# Patient Record
Sex: MALE | Race: WHITE | NOT HISPANIC OR LATINO
[De-identification: names, ages, dates, MRNs, and addresses within clinical notes are randomized per-mention and may not be internally consistent; named-entity substitution may affect disease eponyms.]

---

## 2021-03-10 ENCOUNTER — APPOINTMENT (OUTPATIENT)
Dept: ELECTROPHYSIOLOGY | Facility: CLINIC | Age: 86
End: 2021-03-10
Payer: MEDICARE

## 2021-03-10 VITALS
WEIGHT: 208 LBS | HEART RATE: 70 BPM | BODY MASS INDEX: 31.52 KG/M2 | HEIGHT: 68 IN | SYSTOLIC BLOOD PRESSURE: 130 MMHG | DIASTOLIC BLOOD PRESSURE: 80 MMHG

## 2021-03-10 DIAGNOSIS — I42.9 CARDIOMYOPATHY, UNSPECIFIED: ICD-10-CM

## 2021-03-10 DIAGNOSIS — Z45.02 ENCOUNTER FOR ADJUSTMENT AND MANAGEMENT OF AUTOMATIC IMPLANTABLE CARDIAC DEFIBRILLATOR: ICD-10-CM

## 2021-03-10 PROCEDURE — 93000 ELECTROCARDIOGRAM COMPLETE: CPT | Mod: 59

## 2021-03-10 PROCEDURE — 99204 OFFICE O/P NEW MOD 45 MIN: CPT | Mod: 25

## 2021-03-10 PROCEDURE — 99072 ADDL SUPL MATRL&STAF TM PHE: CPT

## 2021-03-10 NOTE — REVIEW OF SYSTEMS
[Feeling Fatigued] : feeling fatigued [Dyspnea on exertion] : dyspnea during exertion [Lower Ext Edema] : lower extremity edema [Easy Bleeding] : a tendency for easy bleeding [Negative] : Integumentary [Fever] : no fever [Shortness Of Breath] : no shortness of breath [Chest Pain] : no chest pain [Palpitations] : no palpitations [Dizziness] : no dizziness [Convulsions] : no convulsions [Confusion] : no confusion was observed [Anxiety] : no anxiety [Easy Bruising] : no tendency for easy bruising

## 2021-03-10 NOTE — HISTORY OF PRESENT ILLNESS
[FreeTextEntry1] : 89 year old gentleman with history of HTN, HLD, CAD s/p MI and AI s/p CABG and AVR(t) in 2002, ischemic cardiomyopathy with severe LV dysfunction s/p ICD 2011, thoracic aortic aneurysm (conservatively managed), presenting for evaluation of AF, t/c left atrial appendage occlusion, and device management as his ICD is now approaching RRT.  \par \par He has a long history of ischemic cardiomyopathy, as well as IVCD (LB-like). He had been previously well compensated with minimal symptoms, and had seen Dr. Hunter in 2011 and initially CRT-D was recommended. He did undergo dual chamber ICD implant on 4/14/2011 (SJM device).  \par \par On device interrogation he initially had short runs of AT c/w atrial flutter, and since 11/2020 he has been in persistent atrial fibrillation.  \par \par Interrogation of his dual chamber SJM ICD today noted 4% AP and 44%  in DDD 60 mode. The ICD is on FDA advisory for premature battery depletion, and is now approaching RRT with 4.3 month remaining. He has been in persistent AF since 11/2020, and no ventricular arrhythmias have been noted.  The RA is SJM 2088TC, and RV is SJM 7121Q.\par \par He had previously been on Coumadin following his cardiac surgery, but did have bleeding complications including nose bleeds and hemothorax. He has not subsequently had bleeding. Recently he was started on eliquis by Dr. Bernal, which he is tolerating.  \par \par He recently has been feeling more fatigued, and is mildly active. He has had significant LE edema, but denies dyspnea.  \par \par Current meds include coreg 3.125 bid, enalapril 10, atorvastatin, vasotec, Lasix, and eliquis.  \par \par   \par \par

## 2021-03-10 NOTE — DISCUSSION/SUMMARY
[FreeTextEntry1] : 89 year old gentleman with history of HTN, HLD, CAD s/p MI and AI s/p CABG and AVR(t) in 2002, ischemic cardiomyopathy with severe LV dysfunction s/p ICD 2011, thoracic aortic aneurysm (conservatively managed), presenting for evaluation of AF, t/c left atrial appendage occlusion, and device management as his ICD is now approaching RRT. He does have an IVCD (QRS about 140 ms) and has had frequent ventricular pacing (>40% pacing burden recently)- given this in the setting of his cardiomyopathy and CHF, he does qualify for CRT therapy, and upgrade to CRT can be considered at the time of his ICD generator change. We did discuss the risks and benefits of this, vs ICD generator replacement alone, including potential symptomatic benefit as well as procedure-related risks including bleeding, infection, cardiac perforation and pulmonary injury. He would like to consider this further with input from Dr. Bernal. In addition, we did discuss the risks and benefits of ongoing ICD therapy as he is now in his 90s, but he expressed preference to continue ICD functionalities. \par \par In addition, he has newly persistent AF, and has a CHADSVASc score of 5. He has recently been started on anticoagulation with Eliquis. Given his remote history of bleeding, we did discuss the potential for left atrial appendage occlusion as an alternative to anticoagulation if he has significant recurrent bleeding concerns on anticoagulation. In the future, DCCV could also be considered, but would defer this until after upcoming device procedure.  \par \par -ICD gen change and possible CRT upgrade.  will plan in about 4 months when device is expected to reach BERNADETTE.\par \par -continue eliquis, but hold 24 hours prior to device procedure.  \par \par -EP follow-up after above

## 2021-03-10 NOTE — PHYSICAL EXAM
[General Appearance - Well Developed] : well developed [Well Groomed] : well groomed [General Appearance - Well Nourished] : well nourished [General Appearance - In No Acute Distress] : no acute distress [Normal Conjunctiva] : the conjunctiva exhibited no abnormalities [Normal Oral Mucosa] : normal oral mucosa [Normal Jugular Venous V Waves Present] : normal jugular venous V waves present [Heart Sounds] : normal S1 and S2 [Respiration, Rhythm And Depth] : normal respiratory rhythm and effort [Auscultation Breath Sounds / Voice Sounds] : lungs were clear to auscultation bilaterally [Abdomen Soft] : soft [Nail Clubbing] : no clubbing of the fingernails [Cyanosis, Localized] : no localized cyanosis [] : no rash [Oriented To Time, Place, And Person] : oriented to person, place, and time [No Anxiety] : not feeling anxious [FreeTextEntry1] : irregularly irregular. 2+ pitting edema

## 2021-03-10 NOTE — REASON FOR VISIT
[Consultation] : a consultation regarding [Atrial Fibrillation] : atrial fibrillation [Spouse] : spouse [FreeTextEntry1] : ref Dr Bernal

## 2021-06-22 ENCOUNTER — APPOINTMENT (OUTPATIENT)
Dept: DISASTER EMERGENCY | Facility: CLINIC | Age: 86
End: 2021-06-22

## 2021-06-25 ENCOUNTER — INPATIENT (INPATIENT)
Facility: HOSPITAL | Age: 86
LOS: 0 days | Discharge: HOME | DRG: 227 | End: 2021-06-26
Attending: STUDENT IN AN ORGANIZED HEALTH CARE EDUCATION/TRAINING PROGRAM | Admitting: STUDENT IN AN ORGANIZED HEALTH CARE EDUCATION/TRAINING PROGRAM
Payer: MEDICARE

## 2021-06-25 ENCOUNTER — TRANSCRIPTION ENCOUNTER (OUTPATIENT)
Age: 86
End: 2021-06-25

## 2021-06-25 VITALS
HEART RATE: 63 BPM | DIASTOLIC BLOOD PRESSURE: 83 MMHG | RESPIRATION RATE: 18 BRPM | TEMPERATURE: 98 F | OXYGEN SATURATION: 97 % | SYSTOLIC BLOOD PRESSURE: 118 MMHG

## 2021-06-25 DIAGNOSIS — I42.9 CARDIOMYOPATHY, UNSPECIFIED: ICD-10-CM

## 2021-06-25 DIAGNOSIS — I25.5 ISCHEMIC CARDIOMYOPATHY: ICD-10-CM

## 2021-06-25 LAB
ABO RH CONFIRMATION: SIGNIFICANT CHANGE UP
ANION GAP SERPL CALC-SCNC: 11 MMOL/L — SIGNIFICANT CHANGE UP (ref 5–17)
BLD GP AB SCN SERPL QL: SIGNIFICANT CHANGE UP
BUN SERPL-MCNC: 36.7 MG/DL — HIGH (ref 8–20)
CALCIUM SERPL-MCNC: 9.3 MG/DL — SIGNIFICANT CHANGE UP (ref 8.6–10.2)
CHLORIDE SERPL-SCNC: 102 MMOL/L — SIGNIFICANT CHANGE UP (ref 98–107)
CO2 SERPL-SCNC: 27 MMOL/L — SIGNIFICANT CHANGE UP (ref 22–29)
CREAT SERPL-MCNC: 1.43 MG/DL — HIGH (ref 0.5–1.3)
GLUCOSE SERPL-MCNC: 107 MG/DL — HIGH (ref 70–99)
HCT VFR BLD CALC: 38.5 % — LOW (ref 39–50)
HGB BLD-MCNC: 13 G/DL — SIGNIFICANT CHANGE UP (ref 13–17)
MAGNESIUM SERPL-MCNC: 2.1 MG/DL — SIGNIFICANT CHANGE UP (ref 1.6–2.6)
MCHC RBC-ENTMCNC: 33 PG — SIGNIFICANT CHANGE UP (ref 27–34)
MCHC RBC-ENTMCNC: 33.8 GM/DL — SIGNIFICANT CHANGE UP (ref 32–36)
MCV RBC AUTO: 97.7 FL — SIGNIFICANT CHANGE UP (ref 80–100)
PLATELET # BLD AUTO: 126 K/UL — LOW (ref 150–400)
POTASSIUM SERPL-MCNC: 4.4 MMOL/L — SIGNIFICANT CHANGE UP (ref 3.5–5.3)
POTASSIUM SERPL-SCNC: 4.4 MMOL/L — SIGNIFICANT CHANGE UP (ref 3.5–5.3)
RBC # BLD: 3.94 M/UL — LOW (ref 4.2–5.8)
RBC # FLD: 14.1 % — SIGNIFICANT CHANGE UP (ref 10.3–14.5)
SODIUM SERPL-SCNC: 140 MMOL/L — SIGNIFICANT CHANGE UP (ref 135–145)
WBC # BLD: 5.63 K/UL — SIGNIFICANT CHANGE UP (ref 3.8–10.5)
WBC # FLD AUTO: 5.63 K/UL — SIGNIFICANT CHANGE UP (ref 3.8–10.5)

## 2021-06-25 PROCEDURE — 33234 REMOVAL OF PACEMAKER SYSTEM: CPT

## 2021-06-25 PROCEDURE — 33225 L VENTRIC PACING LEAD ADD-ON: CPT

## 2021-06-25 PROCEDURE — 93010 ELECTROCARDIOGRAM REPORT: CPT

## 2021-06-25 RX ORDER — ATORVASTATIN CALCIUM 80 MG/1
1 TABLET, FILM COATED ORAL
Qty: 0 | Refills: 0 | DISCHARGE

## 2021-06-25 RX ORDER — CARVEDILOL PHOSPHATE 80 MG/1
1 CAPSULE, EXTENDED RELEASE ORAL
Qty: 0 | Refills: 0 | DISCHARGE

## 2021-06-25 RX ORDER — ASPIRIN/CALCIUM CARB/MAGNESIUM 324 MG
81 TABLET ORAL DAILY
Refills: 0 | Status: DISCONTINUED | OUTPATIENT
Start: 2021-06-25 | End: 2021-06-26

## 2021-06-25 RX ORDER — ASPIRIN/CALCIUM CARB/MAGNESIUM 324 MG
1 TABLET ORAL
Qty: 0 | Refills: 0 | DISCHARGE

## 2021-06-25 RX ORDER — CEFAZOLIN SODIUM 1 G
2000 VIAL (EA) INJECTION
Refills: 0 | Status: COMPLETED | OUTPATIENT
Start: 2021-06-25 | End: 2021-06-26

## 2021-06-25 RX ORDER — CARVEDILOL PHOSPHATE 80 MG/1
6.25 CAPSULE, EXTENDED RELEASE ORAL EVERY 12 HOURS
Refills: 0 | Status: DISCONTINUED | OUTPATIENT
Start: 2021-06-25 | End: 2021-06-26

## 2021-06-25 RX ORDER — ATORVASTATIN CALCIUM 80 MG/1
10 TABLET, FILM COATED ORAL AT BEDTIME
Refills: 0 | Status: DISCONTINUED | OUTPATIENT
Start: 2021-06-25 | End: 2021-06-26

## 2021-06-25 RX ORDER — FUROSEMIDE 40 MG
40 TABLET ORAL DAILY
Refills: 0 | Status: DISCONTINUED | OUTPATIENT
Start: 2021-06-25 | End: 2021-06-26

## 2021-06-25 RX ORDER — OXYCODONE AND ACETAMINOPHEN 5; 325 MG/1; MG/1
1 TABLET ORAL EVERY 4 HOURS
Refills: 0 | Status: DISCONTINUED | OUTPATIENT
Start: 2021-06-25 | End: 2021-06-26

## 2021-06-25 RX ORDER — ACETAMINOPHEN 500 MG
650 TABLET ORAL EVERY 6 HOURS
Refills: 0 | Status: DISCONTINUED | OUTPATIENT
Start: 2021-06-25 | End: 2021-06-26

## 2021-06-25 RX ORDER — FUROSEMIDE 40 MG
1 TABLET ORAL
Qty: 0 | Refills: 0 | DISCHARGE

## 2021-06-25 RX ADMIN — Medication 100 MILLIGRAM(S): at 21:59

## 2021-06-25 RX ADMIN — ATORVASTATIN CALCIUM 10 MILLIGRAM(S): 80 TABLET, FILM COATED ORAL at 21:59

## 2021-06-25 RX ADMIN — CARVEDILOL PHOSPHATE 6.25 MILLIGRAM(S): 80 CAPSULE, EXTENDED RELEASE ORAL at 17:48

## 2021-06-25 NOTE — DISCHARGE NOTE PROVIDER - CARE PROVIDER_API CALL
Lauri Coates)  Cardiology; Internal Medicine  [address], [address]  [place] [address]  Phone: [phone]  Fax: [phone]  Established Patient  Follow Up Time: 2 weeks    Franki Bernal)  Cardiovascular Disease; Internal Medicine  [facility], [address], [address]  [place] [address]  Phone: [phone]  Fax: [phone]  Established Patient  Follow Up Time: Routine

## 2021-06-25 NOTE — DISCHARGE NOTE PROVIDER - HOSPITAL COURSE
89 year old gentleman with history of HTN, HLD, CAD s/p MI and AI s/p CABG and AVR(t) in 2002, thoracic aortic aneurysm (conservatively managed), persistent AF on Eliquis 2.5mg, ischemic cardiomyopathy with NYHA class II-III chronic HFrEF (LVEF 25%) s/p ICD 2011 now with left bundloid IVCD who is s/p successful upgrade to [employer] CRT-D with insertion of LV lead.     89 year old gentleman with history of HTN, HLD, CAD s/p MI and AI s/p CABG and AVR(t) in 2002, thoracic aortic aneurysm (conservatively managed), persistent AF on Eliquis 2.5mg, ischemic cardiomyopathy with NYHA class II-III chronic HFrEF (LVEF 25%) s/p DC SJM ICD 2011 now with progressive MOSES, left bundloid IVCD and RV pacing >44% who presented electively 6/25/21 for and is now s/p successful upgrade to [employer] CRT-D with insertion of LV lead. VVI 70bpm (persistent AF)    -am labs, telemetry, ECG and device interrogation are wnl  -Chest X-ray PA and LAT STAT to eval lead position  -NO LOVENOX OR HEPARIN INCLUDING SQ UNLESS CLEARED BY EP  -no lifting affected arm over shoulder x 6 weeks  -attempt at increased coreg 6.25 with low BP 90/55 this am and feeling tired. will discharge on 3.125mg bid and consider increase as outpt if tolerated and needed to optimize BIV pacing, if unable to tolerate can consider AVJ in future    -Resume Eliquis in 48 hours 6/28/21 at 8am  - GDMT with coreg & enalapril   -discharge home once xray resulted  -device care, restrictions, shock plan and outpt follow up reviewed with pt, all questions answered       89 year old gentleman with history of HTN, HLD, CAD s/p MI and AI s/p CABG and AVR(t) in 2002, thoracic aortic aneurysm (conservatively managed), persistent AF on Eliquis 2.5mg, ischemic cardiomyopathy with NYHA class II-III chronic HFrEF (LVEF 25%) s/p DC SJM ICD 2011 now with progressive MOSES, left bundloid IVCD and RV pacing >44% who presented electively 6/25/21 for and is now s/p successful upgrade to [employer] CRT-D with insertion of LV lead. VVI 70bpm (persistent AF)    -am labs, telemetry, ECG and device interrogation are wnl  -Chest X-ray PA and LAT STAT WNL  -NO LOVENOX OR HEPARIN INCLUDING SQ UNLESS CLEARED BY EP  -no lifting affected arm over shoulder x 6 weeks  -attempt at increased coreg 6.25 with low BP 90/55 this am and feeling tired. will discharge on 3.125mg bid and consider increase as outpt if tolerated and needed to optimize BIV pacing, if unable to tolerate can consider AVJ in future    -Resume Eliquis in 48 hours 6/28/21 at 8am  - GDMT with coreg & enalapril   -discharge home   -device care, restrictions, shock plan and outpt follow up reviewed with pt, all questions answered

## 2021-06-25 NOTE — H&P PST ADULT - ASSESSMENT
89 year old gentleman with history of HTN, HLD, CAD s/p MI and AI s/p CABG and AVR(t) in 2002, , thoracic aortic aneurysm (conservatively managed), persistent AF intolerant of warfarin due to epistaxis and spontaneous hemothorax and currently on Eliquis 2.5mg, ischemic cardiomyopathy with NYHA class II-III chronic HFrEF (LVEF 25%) s/p ICD 2011 ([employer] dual chamber, dual coil, RA: SJM 2088TC, and RV: SJM 7121Q), and left bundloid IVCD. The ICD generator is on FDA advisory for premature battery depletion, and is approaching elective replacement parameters. He now presents for elective generator change with possible upgrade to BiV ICD for cardiac resynchronization therapy and ongoing primary prevention of sudden cardiac death.    Plan:   Risks/benefits ongoing ICD therapy d/w pt, who wishes to maintain ICD function for primary prevention of sudden cardiac death.   Consent w/ Dr. Coates.   Last dose Eliquis 6/24 PM.   NPO.   Chest prep & abx per protocol.   D/C teaching initiated.

## 2021-06-25 NOTE — PROGRESS NOTE ADULT - SUBJECTIVE AND OBJECTIVE BOX
ELECTROPHYSIOLOGY BRIEF POST-OP NOTE    I have personally seen and examined the patient today in cath lab recovery area. No acute post operative complaints.     PRE-OP DIAGNOSIS: Ischemic Cardiomyopathy    POST-OP DIAGNOSIS: Same    PROCEDURE: Upgrade to [employer] CRT-D via left cephalic cut down    COMPLICATIONS: None    CARDIOMYOPATHY: YES    IMPLANT EF%: 25%    NYHA CLASS: III     BETA BLOCKER: Coreg    ACE/ARB: Lisinopril     Physician: Lauri Coates MD  Assistant: None    ESTIMATED BLOOD LOSS: <15 mL    ANESTHESIA TYPE:  [   ]General Anesthesia  [ x ]Sedation  [   ]Local/Regional    CONDITION:  [  ]Critical  [  ]Serious  [ x ]Stable  [  ]Good    SPECIMENS REMOVED (if applicable): Old [employer] Mendez dual chamber generator.     IMPLANT (if applicable): New [employer] Mendez CRT-D with LV lead insertion via left cephalic cut down    PROGRAMMED: VVI 70ppm    EKG: AFib with BIV pacing at 70bpm; QRSD 192ms    Vital Signs   HR: 70  BP: 126/69  RR: 18  SpO2: 97%     Physical Exam:  Constitutional: NAD, AAOx3  Cardiovascular: +S1S2 RRR  Pulmonary: CTA b/l, unlabored  GI: soft NTND +BS  Extremities: no pedal edema,   Neuro: non focal, ZARATE x4    A/P  89 year old gentleman with history of HTN, HLD, CAD s/p MI and AI s/p CABG and AVR(t) in 2002, thoracic aortic aneurysm (conservatively managed), persistent AF on Eliquis 2.5mg, ischemic cardiomyopathy with NYHA class II-III chronic HFrEF (LVEF 25%) s/p ICD 2011 now with left bundloid IVCD who is s/p successful upgrade to [employer] CRT-D with insertion of LV lead.     -Ancef 2gram IV Q8hr x 2 more doses  -Chest X-ray PA and LAT in am to eval lead position  -NO LOVENOX OR HEPARIN INCLUDING SQ UNLESS CLEARED BY EP  -no lifting affected arm over shoulder x 6 weeks  -Increased Coreg (3.125mg to 6.25mg) as clinically tolerated to increase BIV pacing. Limited in past due to hypotension. Potential AVJ ablation as outpatient in future.   -Resume Eliquis in 48 hours  -AM labs and EKG  -St. Mendez check in AM  -Pressure dressing to be removed in AM.   -Discharge planning home tomorrow

## 2021-06-25 NOTE — H&P PST ADULT - NSICDXPASTSURGICALHX_GEN_ALL_CORE_FT
PAST SURGICAL HISTORY:  History of Bilateral Inguinal Hernia Repair     History of Coronary Artery Bypass Surgery

## 2021-06-25 NOTE — DISCHARGE NOTE PROVIDER - NSDCMRMEDTOKEN_GEN_ALL_CORE_FT
atorvastatin 10 mg oral tablet: 1 tab(s) orally once a day  carvedilol 3.125 mg oral tablet: 1 tab(s) orally 2 times a day  Ecotrin Adult Low Strength 81 mg oral delayed release tablet: 1 tab(s) orally once a day  Eliquis 2.5 mg oral tablet: 1 tab(s) orally 2 times a day  enalapril 10 mg oral tablet: 1 tab(s) orally once a day  furosemide 40 mg oral tablet: 1 tab(s) orally once a day   acetaminophen 325 mg oral tablet: 2 tab(s) orally every 6 hours, As needed, Mild Pain (1 - 3)  atorvastatin 10 mg oral tablet: 1 tab(s) orally once a day  carvedilol 3.125 mg oral tablet: 1 tab(s) orally 2 times a day  Ecotrin Adult Low Strength 81 mg oral delayed release tablet: 1 tab(s) orally once a day  Eliquis 2.5 mg oral tablet: 1 tab(s) orally 2 times a day.  Fidel hold this medication for 48 hours s/p device implant. Resume on 6/28/21 in the morning.  enalapril 10 mg oral tablet: 1 tab(s) orally once a day  furosemide 40 mg oral tablet: 1 tab(s) orally once a day

## 2021-06-25 NOTE — DISCHARGE NOTE PROVIDER - NSDCFUADDINST_GEN_ALL_CORE_FT
Follow up with Dr. Coates in two weeks time. The office will call you with an appointment in 3-5 days time.  Follow up with Dr. Coates in two weeks time. The office will call you with an appointment in 3-5 days time.   Resume your Eliquis in 48 hours time.  Please call [facility] at [phone] to schedule a 2-4 week follow up, sooner if needed.  Please follow up with Dr Alicea in 1-2 weeks, sooner if needed   Resume your Eliquis in 48 hours time.

## 2021-06-25 NOTE — H&P PST ADULT - HISTORY OF PRESENT ILLNESS
89 year old gentleman with history of HTN, HLD, CAD s/p MI and AI s/p CABG and AVR(t) in 2002, , thoracic aortic aneurysm (conservatively managed), persistent AF intolerant of warfarin due to epistaxis and spontaneous hemothorax and currently on Eliquis 2.5mg, ischemic cardiomyopathy with NYHA class II-III chronic HFrEF (LVEF 25%) s/p ICD 2011 ([employer] dual chamber, dual coil, RA: SJM 2088TC, and RV: SJM 7121Q), and left bundloid IVCD. The ICD generator is on FDA advisory for premature battery depletion, and is approaching elective replacement parameters. He now presents for elective generator change with possible upgrade to BiV ICD for cardiac resynchronization therapy and ongoing primary prevention of sudden cardiac death. Patient currently denies chest pain, shortness of breath at rest or with exertion, near/true syncope, fevers/chills, N/V/D, or other cardiac or constitutional symptoms.      Stress Test: 5/31/19, mod-severely abnormal with medium-large sized anterior, anteroseptal, and apical MI.   Echo: 12/22/20, LVEF 25%, LA 5.4cm, mod LVH, severe global HK, mod MR, mild PI, mild TR, RVSP 46

## 2021-06-25 NOTE — DISCHARGE NOTE PROVIDER - PROVIDER TOKENS
PROVIDER:[TOKEN:[[id number]],FOLLOWUP:[2 weeks],ESTABLISHEDPATIENT:[T]],PROVIDER:[TOKEN:[[id number]],FOLLOWUP:[Routine],ESTABLISHEDPATIENT:[T]]

## 2021-06-25 NOTE — PROGRESS NOTE ADULT - SUBJECTIVE AND OBJECTIVE BOX
Admission Criteria  Please admit the patient to the following service: Telemetry 3GUL    Major Criteria:  - LV dysfunction (EF<30%)  Minor Criteria:  - Age >80 years  Major Criteria:  - Significant volume load > 200 ml    Admit to: Telemetry 3GUL (1 Major ciriteria/2 or more minor criteria) Patient is being admitted to the inpatient service due to high risk characteristics and need for further management/monitoring and is considered to be at a significantly increased risk of major adverse cardiac and vascular events if discharged.

## 2021-06-25 NOTE — DISCHARGE NOTE PROVIDER - NSDCCPTREATMENT_GEN_ALL_CORE_FT
PRINCIPAL PROCEDURE  Procedure: Insertion, ICD, biventricular  Findings and Treatment: Cardiac Device Implant Post Operative Instructions  - Do not touch the incision until it is completely healed.   - There are Steristrips (white strips of tape) on your incision, which will start to peel off on their own over the next 2-3 weeks. Do not pick at or peel off the Steristrips.   - Bruising around the implant site or over the chest, side or arm near the incision is normal, and will take a few weeks to resolve.  -Do not lift the affected arm higher than 90 degrees (shoulder height) in any direction for 4 weeks.   - Do not push, pull or lift anything heavier than 10 lbs (about a gallon of milk) with the affected arm for 4 weeks.     - Do not apply soaps, creams, lotions, ointments or powders to the incision until it is completely healed.  - You may take a shower in 24 hours, and allow the water to run over the incision. However, do not submerge the incision in water: do not swim or soak in bath tubs, hot tubs, swimming pools, etc.   You should call the doctor if:   - You notice redness, drainage, swelling, increased tenderness, hot sensation around the incision, bleeding or incision edges pulling apart.  - Your temperature is greater than 100 degrees F for more than 24 hours.  - You notice swelling or bulging at the incision or around the device that was not there when you left the hospital or is increasing in size.  - You experience increased difficulty breathing.  - You notice new/worsening swelling in your legs and ankles.  - You faint or have dizzy spells.  - You have any questions or concerns regarding your device or the procedure.         PRINCIPAL PROCEDURE  Procedure: Insertion, ICD, biventricular  Findings and Treatment: Cardiac Device Implant Post Operative Instructions  - hold Eliquis x 48 hours, resume on 6/28/21 in the morning   - Do not touch the incision until it is completely healed.   - There is dermabond (purple surgical glue on your incision. Please do not pick or peel it off, it will slowly fall off on its own.   - Bruising around the implant site or over the chest, side or arm near the incision is normal, and will take a few weeks to resolve.  -Do not lift the affected arm higher than 90 degrees (shoulder height) in any direction for 6 weeks.   - Do not push, pull or lift anything heavier than 10 lbs (about a gallon of milk) with the affected arm for 4 weeks.     - Do not apply soaps, creams, lotions, ointments or powders to the incision until it is completely healed.  - You may take a shower in 24 hours, and allow the water to run over the incision. However, do not submerge the incision in water: do not swim or soak in bath tubs, hot tubs, swimming pools, etc.   You should call the doctor if:   - You notice redness, drainage, swelling, increased tenderness, hot sensation around the incision, bleeding or incision edges pulling apart.  - Your temperature is greater than 100 degrees F for more than 24 hours.  - You notice swelling or bulging at the incision or around the device that was not there when you left the hospital or is increasing in size.  - You experience increased difficulty breathing.  - You notice new/worsening swelling in your legs and ankles.  - You faint or have dizzy spells.  - You have any questions or concerns regarding your device or the procedure.

## 2021-06-26 ENCOUNTER — TRANSCRIPTION ENCOUNTER (OUTPATIENT)
Age: 86
End: 2021-06-26

## 2021-06-26 VITALS — SYSTOLIC BLOOD PRESSURE: 99 MMHG | DIASTOLIC BLOOD PRESSURE: 56 MMHG

## 2021-06-26 LAB
ANION GAP SERPL CALC-SCNC: 14 MMOL/L — SIGNIFICANT CHANGE UP (ref 5–17)
BASOPHILS # BLD AUTO: 0.02 K/UL — SIGNIFICANT CHANGE UP (ref 0–0.2)
BASOPHILS NFR BLD AUTO: 0.4 % — SIGNIFICANT CHANGE UP (ref 0–2)
BUN SERPL-MCNC: 32.9 MG/DL — HIGH (ref 8–20)
CALCIUM SERPL-MCNC: 8.7 MG/DL — SIGNIFICANT CHANGE UP (ref 8.6–10.2)
CHLORIDE SERPL-SCNC: 102 MMOL/L — SIGNIFICANT CHANGE UP (ref 98–107)
CO2 SERPL-SCNC: 23 MMOL/L — SIGNIFICANT CHANGE UP (ref 22–29)
COVID-19 SPIKE DOMAIN AB INTERP: POSITIVE
COVID-19 SPIKE DOMAIN ANTIBODY RESULT: 1.9 U/ML — HIGH
CREAT SERPL-MCNC: 1.27 MG/DL — SIGNIFICANT CHANGE UP (ref 0.5–1.3)
EOSINOPHIL # BLD AUTO: 0.19 K/UL — SIGNIFICANT CHANGE UP (ref 0–0.5)
EOSINOPHIL NFR BLD AUTO: 3.6 % — SIGNIFICANT CHANGE UP (ref 0–6)
GLUCOSE SERPL-MCNC: 97 MG/DL — SIGNIFICANT CHANGE UP (ref 70–99)
HCT VFR BLD CALC: 36.6 % — LOW (ref 39–50)
HGB BLD-MCNC: 12 G/DL — LOW (ref 13–17)
IMM GRANULOCYTES NFR BLD AUTO: 0.2 % — SIGNIFICANT CHANGE UP (ref 0–1.5)
LYMPHOCYTES # BLD AUTO: 0.56 K/UL — LOW (ref 1–3.3)
LYMPHOCYTES # BLD AUTO: 10.6 % — LOW (ref 13–44)
MAGNESIUM SERPL-MCNC: 2.1 MG/DL — SIGNIFICANT CHANGE UP (ref 1.6–2.6)
MCHC RBC-ENTMCNC: 32.6 PG — SIGNIFICANT CHANGE UP (ref 27–34)
MCHC RBC-ENTMCNC: 32.8 GM/DL — SIGNIFICANT CHANGE UP (ref 32–36)
MCV RBC AUTO: 99.5 FL — SIGNIFICANT CHANGE UP (ref 80–100)
MONOCYTES # BLD AUTO: 0.68 K/UL — SIGNIFICANT CHANGE UP (ref 0–0.9)
MONOCYTES NFR BLD AUTO: 12.9 % — SIGNIFICANT CHANGE UP (ref 2–14)
NEUTROPHILS # BLD AUTO: 3.82 K/UL — SIGNIFICANT CHANGE UP (ref 1.8–7.4)
NEUTROPHILS NFR BLD AUTO: 72.3 % — SIGNIFICANT CHANGE UP (ref 43–77)
PLATELET # BLD AUTO: 113 K/UL — LOW (ref 150–400)
POTASSIUM SERPL-MCNC: 3.7 MMOL/L — SIGNIFICANT CHANGE UP (ref 3.5–5.3)
POTASSIUM SERPL-SCNC: 3.7 MMOL/L — SIGNIFICANT CHANGE UP (ref 3.5–5.3)
RBC # BLD: 3.68 M/UL — LOW (ref 4.2–5.8)
RBC # FLD: 14 % — SIGNIFICANT CHANGE UP (ref 10.3–14.5)
SARS-COV-2 IGG+IGM SERPL QL IA: 1.9 U/ML — HIGH
SARS-COV-2 IGG+IGM SERPL QL IA: POSITIVE
SODIUM SERPL-SCNC: 139 MMOL/L — SIGNIFICANT CHANGE UP (ref 135–145)
WBC # BLD: 5.28 K/UL — SIGNIFICANT CHANGE UP (ref 3.8–10.5)
WBC # FLD AUTO: 5.28 K/UL — SIGNIFICANT CHANGE UP (ref 3.8–10.5)

## 2021-06-26 PROCEDURE — 33234 REMOVAL OF PACEMAKER SYSTEM: CPT

## 2021-06-26 PROCEDURE — 80048 BASIC METABOLIC PNL TOTAL CA: CPT

## 2021-06-26 PROCEDURE — 86901 BLOOD TYPING SEROLOGIC RH(D): CPT

## 2021-06-26 PROCEDURE — C1894: CPT

## 2021-06-26 PROCEDURE — 36415 COLL VENOUS BLD VENIPUNCTURE: CPT

## 2021-06-26 PROCEDURE — 71046 X-RAY EXAM CHEST 2 VIEWS: CPT

## 2021-06-26 PROCEDURE — 93010 ELECTROCARDIOGRAM REPORT: CPT

## 2021-06-26 PROCEDURE — C1730: CPT

## 2021-06-26 PROCEDURE — 33225 L VENTRIC PACING LEAD ADD-ON: CPT

## 2021-06-26 PROCEDURE — 71046 X-RAY EXAM CHEST 2 VIEWS: CPT | Mod: 26

## 2021-06-26 PROCEDURE — 85027 COMPLETE CBC AUTOMATED: CPT

## 2021-06-26 PROCEDURE — 86900 BLOOD TYPING SEROLOGIC ABO: CPT

## 2021-06-26 PROCEDURE — C1887: CPT

## 2021-06-26 PROCEDURE — 86850 RBC ANTIBODY SCREEN: CPT

## 2021-06-26 PROCEDURE — 83735 ASSAY OF MAGNESIUM: CPT

## 2021-06-26 PROCEDURE — C1769: CPT

## 2021-06-26 PROCEDURE — C1892: CPT

## 2021-06-26 PROCEDURE — 86769 SARS-COV-2 COVID-19 ANTIBODY: CPT

## 2021-06-26 PROCEDURE — C1882: CPT

## 2021-06-26 PROCEDURE — C1900: CPT

## 2021-06-26 PROCEDURE — 93005 ELECTROCARDIOGRAM TRACING: CPT

## 2021-06-26 PROCEDURE — C1889: CPT

## 2021-06-26 PROCEDURE — 85025 COMPLETE CBC W/AUTO DIFF WBC: CPT

## 2021-06-26 RX ORDER — ACETAMINOPHEN 500 MG
2 TABLET ORAL
Qty: 0 | Refills: 0 | DISCHARGE
Start: 2021-06-26

## 2021-06-26 RX ORDER — APIXABAN 2.5 MG/1
1 TABLET, FILM COATED ORAL
Qty: 0 | Refills: 0 | DISCHARGE

## 2021-06-26 RX ORDER — POTASSIUM CHLORIDE 20 MEQ
20 PACKET (EA) ORAL ONCE
Refills: 0 | Status: COMPLETED | OUTPATIENT
Start: 2021-06-26 | End: 2021-06-26

## 2021-06-26 RX ORDER — CARVEDILOL PHOSPHATE 80 MG/1
3.12 CAPSULE, EXTENDED RELEASE ORAL EVERY 12 HOURS
Refills: 0 | Status: DISCONTINUED | OUTPATIENT
Start: 2021-06-26 | End: 2021-06-26

## 2021-06-26 RX ADMIN — CARVEDILOL PHOSPHATE 6.25 MILLIGRAM(S): 80 CAPSULE, EXTENDED RELEASE ORAL at 06:14

## 2021-06-26 RX ADMIN — Medication 81 MILLIGRAM(S): at 09:21

## 2021-06-26 RX ADMIN — Medication 40 MILLIGRAM(S): at 06:14

## 2021-06-26 RX ADMIN — Medication 10 MILLIGRAM(S): at 06:14

## 2021-06-26 RX ADMIN — Medication 100 MILLIGRAM(S): at 06:37

## 2021-06-26 RX ADMIN — Medication 20 MILLIEQUIVALENT(S): at 09:23

## 2021-06-26 NOTE — PROGRESS NOTE ADULT - SUBJECTIVE AND OBJECTIVE BOX
POD #1 s/p upgrade to [facility] BIV ICD (addition of LV lead).   No overnight events. Feeling tired this am, but denies CP, SOB at rest, or palpitation.      ECG: AF, biventricular paced at 70bpm   TELE: AF vpaced  Device Interrogation: full [facility] chart[facility], sending, impedance and thresholds are stable and wnl. VVI 70. BP 97%. VT-1 Monitor 150bpm, VT-2 187bpm ATP x 3, 36J x 1, 40Jx 3, VF 222bpm, ATP x 1, 36j x 1, 40J x 5  CXR PA/LAT:      MEDICATIONS  (STANDING):  aspirin enteric coated 81 milliGRAM(s) Oral daily  atorvastatin 10 milliGRAM(s) Oral at bedtime  carvedilol 6.25 milliGRAM(s) Oral every 12 hours  enalapril 10 milliGRAM(s) Oral daily  furosemide    Tablet 40 milliGRAM(s) Oral daily  potassium chloride   Powder 20 milliEquivalent(s) Oral once    Vital Signs Last 24 Hrs  T(C): 36.8 (26 Jun 2021 05:45), Max: 36.8 (26 Jun 2021 05:45)  T(F): 98.3 (26 Jun 2021 05:45), Max: 98.3 (26 Jun 2021 05:45)  HR: 69 (26 Jun 2021 07:17) (63 - 69)  BP: 90/55 (26 Jun 2021 07:17) (90/55 - 139/74)  RR: 18 (26 Jun 2021 07:17) (17 - 23)  SpO2: 95% (26 Jun 2021 07:17) (93% - 100%)    Physical Exam:  Constitutional: NAD, AAOx3  Cardiovascular: +S1S2 RRR  LACW: +dermabond, no bleeding, swelling, hematoma  Pulmonary: decreased at bases, but unlabored without crackles, wheeze or rales   GI: soft NTND +BS  Extremities: +2 b/l LE edema   Neuro: non focal, ZARATE x4    LABS:                        12.0   5.28  )-----------( 113      ( 26 Jun 2021 06:36 )             36.6     06-26    139  |  102  |  32.9<H>  ----------------------------<  97  3.7   |  23.0  |  1.27    Ca    8.7      26 Jun 2021 06:36  Mg     2.1     06-26      RADIOLOGY & ADDITIONAL TESTS:  Stress Test: 5/31/19, mod-severely abnormal with medium-large sized anterior, anteroseptal, and apical MI.   Echo: 12/22/20, LVEF 25%, LA 5.4cm, mod LVH, severe global HK, mod MR, mild PI, mild TR, RVSP 46     A/P: 89 year old gentleman with history of HTN, HLD, CAD s/p MI and AI s/p CABG and AVR(t) in 2002, thoracic aortic aneurysm (conservatively managed), persistent AF on Eliquis 2.5mg, ischemic cardiomyopathy with NYHA class II-III chronic HFrEF (LVEF 25%) s/p DC SJM ICD 2011 now with progressive MOSES, left bundloid IVCD and RV pacing >44% who presented electively 6/25/21 for and is now s/p successful upgrade to [facility] CRT-D with insertion of LV lead. VVI 70bpm (persistent AF)    -am labs, telemetry, ECG and device interrogation are wnl  -Chest X-ray PA and LAT STAT to eval lead position  -NO LOVENOX OR HEPARIN INCLUDING SQ UNLESS CLEARED BY EP  -no lifting affected arm over shoulder x 6 weeks  -attempt at increased coreg 6.25 with low BP 90/55 this am and feeling tired. will discharge on 3.125mg bid and consider increase as outpt if tolerated and needed to optimize BIV pacing, if unable to tolerate can consider AVJ in future    -Resume Eliquis in 48 hours 6/28/21 at 8am  -discharge home once xray resulted  -device care, restrictions, shock plan and outpt follow up reviewed with pt, all questions answered    DW Dr Coates, agrees          POD #1 s/p upgrade to [facility] BIV ICD (addition of LV lead).   No overnight events. Feeling tired this am, but denies CP, SOB at rest, or palpitation.      ECG: AF, biventricular paced at 70bpm   TELE: AF vpaced  Device Interrogation: full [facility] chart[facility], sending, impedance and thresholds are stable and wnl. VVI 70. BP 97%. VT-1 Monitor 150bpm, VT-2 187bpm ATP x 3, 36J x 1, 40Jx 3, VF 222bpm, ATP x 1, 36j x 1, 40J x 5  CXR PA/LAT: pending       MEDICATIONS  (STANDING):  aspirin enteric coated 81 milliGRAM(s) Oral daily  atorvastatin 10 milliGRAM(s) Oral at bedtime  carvedilol 6.25 milliGRAM(s) Oral every 12 hours  enalapril 10 milliGRAM(s) Oral daily  furosemide    Tablet 40 milliGRAM(s) Oral daily  potassium chloride   Powder 20 milliEquivalent(s) Oral once    Vital Signs Last 24 Hrs  T(C): 36.8 (26 Jun 2021 05:45), Max: 36.8 (26 Jun 2021 05:45)  T(F): 98.3 (26 Jun 2021 05:45), Max: 98.3 (26 Jun 2021 05:45)  HR: 69 (26 Jun 2021 07:17) (63 - 69)  BP: 90/55 (26 Jun 2021 07:17) (90/55 - 139/74)  RR: 18 (26 Jun 2021 07:17) (17 - 23)  SpO2: 95% (26 Jun 2021 07:17) (93% - 100%)    Physical Exam:  Constitutional: NAD, AAOx3  Cardiovascular: +S1S2 RRR  LACW: +dermabond, no bleeding, swelling, hematoma  Pulmonary: decreased at bases, but unlabored without crackles, wheeze or rales   GI: soft NTND +BS  Extremities: +2 b/l LE edema   Neuro: non focal, ZARATE x4    LABS:                        12.0   5.28  )-----------( 113      ( 26 Jun 2021 06:36 )             36.6     06-26    139  |  102  |  32.9<H>  ----------------------------<  97  3.7   |  23.0  |  1.27    Ca    8.7      26 Jun 2021 06:36  Mg     2.1     06-26      RADIOLOGY & ADDITIONAL TESTS:  Stress Test: 5/31/19, mod-severely abnormal with medium-large sized anterior, anteroseptal, and apical MI.   Echo: 12/22/20, LVEF 25%, LA 5.4cm, mod LVH, severe global HK, mod MR, mild PI, mild TR, RVSP 46     A/P: 89 year old gentleman with history of HTN, HLD, CAD s/p MI and AI s/p CABG and AVR(t) in 2002, thoracic aortic aneurysm (conservatively managed), persistent AF on Eliquis 2.5mg, ischemic cardiomyopathy with NYHA class II-III chronic HFrEF (LVEF 25%) s/p DC SJM ICD 2011 now with progressive MOSES, left bundloid IVCD and RV pacing >44% who presented electively 6/25/21 for and is now s/p successful upgrade to [facility] CRT-D with insertion of LV lead. VVI 70bpm (persistent AF)    -am labs, telemetry, ECG and device interrogation are wnl  -Chest X-ray PA and LAT STAT to eval lead position  -NO LOVENOX OR HEPARIN INCLUDING SQ UNLESS CLEARED BY EP  -no lifting affected arm over shoulder x 6 weeks  -attempt at increased coreg 6.25 with low BP 90/55 this am and feeling tired. will discharge on 3.125mg bid and consider increase as outpt if tolerated and needed to optimize BIV pacing, if unable to tolerate can consider AVJ in future    -Resume Eliquis in 48 hours 6/28/21 at 8am  -GDMT with coreg and ramipril   -discharge home once xray resulted  -device care, restrictions, shock plan and outpt follow up reviewed with pt, all questions answered    DW Dr Coates, agrees          POD #1 s/p upgrade to [facility] BIV ICD (addition of LV lead).   No overnight events. Feeling tired this am, but denies CP, SOB at rest, or palpitation.      ECG: AF, biventricular paced at 70bpm   TELE: AF vpaced  Device Interrogation: full report in chart. [facility] HF CRT-D. battery, sending, impedance and thresholds are stable and wnl. VVI 70. BP 97%. VT-1 Monitor 150bpm, VT-2 187bpm ATP x 3, 36J x 1, 40Jx 3, VF 222bpm, ATP x 1, 36j x 1, 40J x 5  CXR PA/LAT: reviewed with DR Fitzgerald.   < from: Xray Chest 2 Views PA/Lat (06.26.21 @ 09:24) >  INTERPRETATION:  TIME OF EXAM: June 26, 2021 at 9:10 AM  CLINICAL INFORMATION: Follow-up post upgrade pacemaker.  TECHNIQUE:   Portable chest  INTERPRETATION:  Left-sided pacemaker is seen in place. There is no complicating pneumothorax. No focal consolidations. Heart size is stable. Sternotomy wires are seen.  COMPARISON:  April 15, 2011  IMPRESSION:  Status post upgrade pacemaker.  GINO FITZGERALD MD; Attending Radiologist  < end of copied text >    MEDICATIONS  (STANDING):  aspirin enteric coated 81 milliGRAM(s) Oral daily  atorvastatin 10 milliGRAM(s) Oral at bedtime  carvedilol 6.25 milliGRAM(s) Oral every 12 hours  enalapril 10 milliGRAM(s) Oral daily  furosemide    Tablet 40 milliGRAM(s) Oral daily  potassium chloride   Powder 20 milliEquivalent(s) Oral once    Vital Signs Last 24 Hrs  T(C): 36.8 (26 Jun 2021 05:45), Max: 36.8 (26 Jun 2021 05:45)  T(F): 98.3 (26 Jun 2021 05:45), Max: 98.3 (26 Jun 2021 05:45)  HR: 69 (26 Jun 2021 07:17) (63 - 69)  BP: 90/55 (26 Jun 2021 07:17) (90/55 - 139/74)  RR: 18 (26 Jun 2021 07:17) (17 - 23)  SpO2: 95% (26 Jun 2021 07:17) (93% - 100%)    Physical Exam:  Constitutional: NAD, AAOx3  Cardiovascular: +S1S2 RRR  LACW: +dermabond, no bleeding, swelling, hematoma  Pulmonary: decreased at bases, but unlabored without crackles, wheeze or rales   GI: soft NTND +BS  Extremities: +2 b/l LE edema, chronic per pt    Neuro: non focal, ZARATE x4    LABS:                        12.0   5.28  )-----------( 113      ( 26 Jun 2021 06:36 )             36.6     06-26    139  |  102  |  32.9<H>  ----------------------------<  97  3.7   |  23.0  |  1.27    Ca    8.7      26 Jun 2021 06:36  Mg     2.1     06-26      RADIOLOGY & ADDITIONAL TESTS:  Stress Test: 5/31/19, mod-severely abnormal with medium-large sized anterior, anteroseptal, and apical MI.   Echo: 12/22/20, LVEF 25%, LA 5.4cm, mod LVH, severe global HK, mod MR, mild PI, mild TR, RVSP 46     A/P: 89 year old gentleman with history of HTN, HLD, CAD s/p MI and AI s/p CABG and AVR(t) in 2002, thoracic aortic aneurysm (conservatively managed), persistent AF on Eliquis 2.5mg, ischemic cardiomyopathy with NYHA class II-III chronic HFrEF (LVEF 25%) s/p DC SJM ICD 2011 now with progressive MOSES, left bundloid IVCD and RV pacing >44% who presented electively 6/25/21 for and is now s/p successful upgrade to [facility] CRT-D with insertion of LV lead. VVI 70bpm (persistent AF)    -am labs, telemetry, ECG and device interrogation are wnl  -Chest X-ray PA and LAT WNL   -NO LOVENOX OR HEPARIN INCLUDING SQ UNLESS CLEARED BY EP  -no lifting affected arm over shoulder x 6 weeks  -attempt at increased coreg 6.25 with low BP 90/55 this am and feeling tired. will discharge on 3.125mg bid and consider increase as outpt if tolerated and needed to optimize BIV pacing, if unable to tolerate can consider AVJ in future    -Resume Eliquis in 48 hours 6/28/21 at 8am  -GDMT with coreg and ramipril   -discharge home   -device care, restrictions, shock plan and outpt follow up reviewed with pt, all questions answered    DW Dr Coates, agrees          POD #1 s/p upgrade to [facility] BIV ICD (addition of LV lead).   No overnight events. Feeling tired this am, but denies CP, SOB at rest, or palpitation.      ECG: AF, biventricular paced at 70bpm   TELE: AF vpaced  Device Interrogation: full report in chart. [facility] HF CRT-D. battery, sending, impedance and thresholds are stable and wnl. VVI 70. BP 97%. VT-1 Monitor 150bpm, VT-2 187bpm ATP x 3, 36J x 1, 40Jx 3, VF 222bpm, ATP x 1, 36j x 1, 40J x 5  CXR PA/LAT: reviewed with DR Fitzgerald.   < from: Xray Chest 2 Views PA/Lat (06.26.21 @ 09:24) >  INTERPRETATION:  TIME OF EXAM: June 26, 2021 at 9:10 AM  CLINICAL INFORMATION: Follow-up post upgrade pacemaker.  TECHNIQUE:   Portable chest  INTERPRETATION:  Left-sided pacemaker is seen in place. There is no complicating pneumothorax. No focal consolidations. Heart size is stable. Sternotomy wires are seen.  COMPARISON:  April 15, 2011  IMPRESSION:  Status post upgrade pacemaker.  GINO FITZGERALD MD; Attending Radiologist  < end of copied text >    MEDICATIONS  (STANDING):  aspirin enteric coated 81 milliGRAM(s) Oral daily  atorvastatin 10 milliGRAM(s) Oral at bedtime  carvedilol 6.25 milliGRAM(s) Oral every 12 hours  enalapril 10 milliGRAM(s) Oral daily  furosemide    Tablet 40 milliGRAM(s) Oral daily  potassium chloride   Powder 20 milliEquivalent(s) Oral once    Vital Signs Last 24 Hrs  T(C): 36.8 (26 Jun 2021 05:45), Max: 36.8 (26 Jun 2021 05:45)  T(F): 98.3 (26 Jun 2021 05:45), Max: 98.3 (26 Jun 2021 05:45)  HR: 69 (26 Jun 2021 07:17) (63 - 69)  BP: 90/55 (26 Jun 2021 07:17) (90/55 - 139/74)  RR: 18 (26 Jun 2021 07:17) (17 - 23)  SpO2: 95% (26 Jun 2021 07:17) (93% - 100%)    Physical Exam:  Constitutional: NAD, AAOx3  Cardiovascular: +S1S2 RRR  LACW: +dermabond, no bleeding, swelling, hematoma  Pulmonary: decreased at bases, but unlabored without crackles, wheeze or rales   GI: soft NTND +BS  Extremities: +1 b/l LE edema, chronic per pt    Neuro: non focal, ZARATE x4    LABS:                        12.0   5.28  )-----------( 113      ( 26 Jun 2021 06:36 )             36.6     06-26    139  |  102  |  32.9<H>  ----------------------------<  97  3.7   |  23.0  |  1.27    Ca    8.7      26 Jun 2021 06:36  Mg     2.1     06-26      RADIOLOGY & ADDITIONAL TESTS:  Stress Test: 5/31/19, mod-severely abnormal with medium-large sized anterior, anteroseptal, and apical MI.   Echo: 12/22/20, LVEF 25%, LA 5.4cm, mod LVH, severe global HK, mod MR, mild PI, mild TR, RVSP 46     A/P: 89 year old gentleman with history of HTN, HLD, CAD s/p MI and AI s/p CABG and AVR(t) in 2002, thoracic aortic aneurysm (conservatively managed), persistent AF on Eliquis 2.5mg, ischemic cardiomyopathy with NYHA class II-III chronic HFrEF (LVEF 25%) s/p DC SJM ICD 2011 now with progressive MOSES, left bundloid IVCD and RV pacing >44% who presented electively 6/25/21 for and is now s/p successful upgrade to [facility] CRT-D with insertion of LV lead. VVI 70bpm (persistent AF)    -am labs, telemetry, ECG and device interrogation are wnl  -Chest X-ray PA and LAT WNL   -NO LOVENOX OR HEPARIN INCLUDING SQ UNLESS CLEARED BY EP  -no lifting affected arm over shoulder x 6 weeks  -attempt at increased coreg 6.25 with low BP 90/55 this am and feeling tired. will discharge on 3.125mg bid and consider increase as outpt if tolerated and needed to optimize BIV pacing, if unable to tolerate can consider AVJ in future    -Resume Eliquis in 48 hours 6/28/21 at 8am  -GDMT with coreg and ramipril   -discharge home   -device care, restrictions, shock plan and outpt follow up reviewed with pt, all questions answered    DW Dr Coates, agrees

## 2021-06-26 NOTE — DISCHARGE NOTE NURSING/CASE MANAGEMENT/SOCIAL WORK - PATIENT PORTAL LINK FT
You can access the FollowMyHealth Patient Portal offered by [facility] by registering at the following website: [email or web address]. By joining [facility]’s FollowMyHealth portal, you will also be able to view your health information using other applications (apps) compatible with our system.